# Patient Record
Sex: FEMALE | Race: WHITE | NOT HISPANIC OR LATINO | URBAN - METROPOLITAN AREA
[De-identification: names, ages, dates, MRNs, and addresses within clinical notes are randomized per-mention and may not be internally consistent; named-entity substitution may affect disease eponyms.]

---

## 2017-01-21 ENCOUNTER — SURGERY - HEALTHEAST (OUTPATIENT)
Dept: CARDIOLOGY | Facility: CLINIC | Age: 62
End: 2017-01-21

## 2017-01-21 ASSESSMENT — MIFFLIN-ST. JEOR
SCORE: 1418.38
SCORE: 1418.38

## 2017-01-22 ASSESSMENT — MIFFLIN-ST. JEOR: SCORE: 1401.59

## 2017-10-19 ENCOUNTER — AMBULATORY - HEALTHEAST (OUTPATIENT)
Dept: CARDIOLOGY | Facility: CLINIC | Age: 62
End: 2017-10-19

## 2017-10-19 DIAGNOSIS — Z00.6 CLINICAL TRIAL PARTICIPANT: ICD-10-CM

## 2017-10-19 DIAGNOSIS — I25.5 ISCHEMIC CARDIOMYOPATHY: ICD-10-CM

## 2021-05-30 VITALS — WEIGHT: 186 LBS | BODY MASS INDEX: 29.89 KG/M2 | HEIGHT: 66 IN

## 2021-06-13 NOTE — PROGRESS NOTES
Baseline Visit     Invested study (trial of Fluzone Quadrivalent Influenza Vaccine vs Fluzone High-Dose Trivalent Influenza Vaccine in reducing rate of death or hospitalization from heart- or lung-related causes in individuals with heart disease at high risk for more heart related events. High-Dose Trivalent Vaccine is investigational in adults under 65.).     Consent process:  Research associate met with subject to discuss participation in the above noted study.     The study discussion continued with an introduction of the study purpose and the qualifications for participation. A review of the study procedures, risks and side effects, benefits (if any), voluntary nature of participation, confidentiality of records, and financial considerations were also included in the discussion.      Subject was provided with a copy of the consent form to review and time to consider participation. Subject agreed to participate, consent form version: 29 JUN 2017 signed. Copy to chart, copy to patient.     Subject administered study vaccine at 10:40 am in left arm.      Plan: Call patient in 1 week      Ella England  [phone]  [email or web address]

## 2021-06-15 PROBLEM — I21.9 MYOCARDIAL INFARCTION (H): Status: ACTIVE | Noted: 2017-01-21
